# Patient Record
Sex: FEMALE | Race: WHITE
[De-identification: names, ages, dates, MRNs, and addresses within clinical notes are randomized per-mention and may not be internally consistent; named-entity substitution may affect disease eponyms.]

---

## 2020-02-17 ENCOUNTER — HOSPITAL ENCOUNTER (EMERGENCY)
Dept: HOSPITAL 76 - ED | Age: 30
Discharge: HOME | End: 2020-02-17
Payer: MEDICAID

## 2020-02-17 VITALS — SYSTOLIC BLOOD PRESSURE: 139 MMHG | DIASTOLIC BLOOD PRESSURE: 67 MMHG
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
## 2020-02-17 DIAGNOSIS — S01.01XA: Primary | ICD-10-CM

## 2020-02-17 PROCEDURE — 12001 RPR S/N/AX/GEN/TRNK 2.5CM/<: CPT

## 2020-02-17 PROCEDURE — 99284 EMERGENCY DEPT VISIT MOD MDM: CPT

## 2020-02-17 PROCEDURE — 99282 EMERGENCY DEPT VISIT SF MDM: CPT

## 2020-02-17 NOTE — ED PHYSICIAN DOCUMENTATION
PD HPI HEAD INJURY





- Stated complaint


Stated Complaint: HEAD LAC





- Chief complaint


Chief Complaint: Laceration





- History obtained from


History obtained from: Patient





- History of Present Illness


Mechanism of head injury: Blow (Metal box)


Timing - onset: How many hours ago (2)


Pain level max: 5


Pain level now: 1


Quality of pain: Pain, Aching


Associated symptoms: No: LOC, AMS, Nausea / vomiting, Neck pain, Paresthesias


Symptoms improve with: Rest


Contributing factors: No: Anticoagulated, Intoxicated


Recently seen: Not recently seen





- Additional information


Additional information: 





No recent travel.  No recent antibiotics.  No rash.Metal box fell off of the 

shelf and struck her head.  No loss of consciousness.  No neck or back pain.  No

neurological deficits.  Has a laceration to the top of the head.  Tetanus is 

up-to-date.





Review of Systems


GI: denies: Vomiting


: denies: Now pregnant EGA


Musculoskeletal: denies: Neck pain, Back pain


Neurologic: denies: Focal weakness, Numbness, Confused, Altered mental status, L

OC





PD PAST MEDICAL HISTORY





- Past Medical History


Past Medical History: No





- Allergies


Allergies/Adverse Reactions: 


                                    Allergies











Allergy/AdvReac Type Severity Reaction Status Date / Time


 


No Known Drug Allergies Allergy   Verified 02/17/20 13:04














PD ED PE NORMAL





- Vitals


Vital signs reviewed: Yes





- General


General: Alert and oriented X 3, No acute distress





- HEENT


HEENT: PERRL, Moist mucous membranes, Other (laceration to the top of the head. 

no palpable skull fractures or hematomas)





- Neck


Neck: Supple, no meningeal sign, No bony TTP





- Cardiac


Cardiac: RRR





- Respiratory


Respiratory: No respiratory distress, Clear bilaterally





- Derm


Derm: Warm and dry





- Neuro


Neuro: Alert and oriented X 3, CNs 2-12 intact, No motor deficit, No sensory 

deficit, Normal speech


Eye Opening: Spontaneous


Motor: Obeys Commands


Verbal: Oriented


GCS Score: 15





- Psych


Psych: Normal mood, Normal affect





Results





- Vitals


Vitals: 


                               Vital Signs - 24 hr











  02/17/20 02/17/20





  13:05 15:46


 


Temperature 36.9 C 


 


Heart Rate 95 86


 


Respiratory 16 18





Rate  


 


Blood Pressure 133/91 H 139/67 H


 


O2 Saturation 99 100








                                     Oxygen











O2 Source                      Room air

















Procedures





- Laceration (location)


  ** scalp


Length in cm: 2


Wound type: Linear, Into subcut fat, Contaminated


Neurovascular status: Sensory intact, Motor intact, Vascular intact


Wound Preparation: Irrigated copiously NS


Skin layer closure: Staples


Other: Patient tolerated well, No complications, Neurovascular intact, Tetanus 

UTD


Complexity: Simple





PD MEDICAL DECISION MAKING





- ED course


Complexity details: considered differential, d/w patient


ED course: 





Scalp laceration repaired.  Tolerated well.  No evidence of cervical spine 

injury.  No evidence of intracranial hemorrhage or skull fracture.  Warnings of 

infection and instructions on wound care given at bedside. Also counseled on how

to minimize scarring.  Patient counseled regarding signs and symptoms for which 

I believe and urgent re-evaluation would be necessary. Patient with good 

understanding of and agreement to plan and is comfortable going home at this 

time





This document was made in part using voice recognition software. While efforts 

are made to proofread this document, sound alike and grammatical errors may 

occur.





Departure





- Departure


Disposition: 01 Home, Self Care


Clinical Impression: 


Scalp laceration


Qualifiers:


 Encounter type: initial encounter Qualified Code(s): S01.01XA - Laceration 

without foreign body of scalp, initial encounter





Condition: Good


Instructions:  ED Laceration Scalp Stitch Or Stap


Follow-Up: 


your,doctor in 10-14 days for staple removal. [Other]


Comments: 


Return if you worsen, especially for redness, swelling or drainage from the 

wound. 


Discharge Date/Time: 02/17/20 15:45